# Patient Record
Sex: FEMALE | Race: WHITE | NOT HISPANIC OR LATINO | Employment: PART TIME | URBAN - METROPOLITAN AREA
[De-identification: names, ages, dates, MRNs, and addresses within clinical notes are randomized per-mention and may not be internally consistent; named-entity substitution may affect disease eponyms.]

---
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
## 2024-09-10 ENCOUNTER — HOSPITAL ENCOUNTER (EMERGENCY)
Facility: HOSPITAL | Age: 19
Discharge: HOME | End: 2024-09-10
Attending: EMERGENCY MEDICINE
Payer: COMMERCIAL

## 2024-09-10 ENCOUNTER — APPOINTMENT (OUTPATIENT)
Dept: GENERAL RADIOLOGY | Facility: HOSPITAL | Age: 19
End: 2024-09-10
Payer: COMMERCIAL

## 2024-09-10 VITALS
RESPIRATION RATE: 16 BRPM | HEIGHT: 64 IN | TEMPERATURE: 98.4 F | SYSTOLIC BLOOD PRESSURE: 114 MMHG | HEART RATE: 67 BPM | OXYGEN SATURATION: 97 % | BODY MASS INDEX: 30.05 KG/M2 | WEIGHT: 176 LBS | DIASTOLIC BLOOD PRESSURE: 65 MMHG

## 2024-09-10 DIAGNOSIS — M25.571 ARTHRALGIA OF RIGHT ANKLE: Primary | ICD-10-CM

## 2024-09-10 PROCEDURE — 99283 EMERGENCY DEPT VISIT LOW MDM: CPT

## 2024-09-10 PROCEDURE — 73610 X-RAY EXAM OF ANKLE: CPT

## 2024-09-10 RX ORDER — NAPROXEN 500 MG/1
500 TABLET ORAL
COMMUNITY
Start: 2024-08-15

## 2024-09-10 RX ORDER — LAMOTRIGINE 100 MG/1
1 TABLET ORAL DAILY
COMMUNITY

## 2024-09-10 RX ORDER — ETONOGESTREL 68 MG/1
68 IMPLANT SUBCUTANEOUS
COMMUNITY

## 2024-09-10 NOTE — ED PROVIDER NOTES
"Subjective   History of Present Illness  This patient is a very nice 19-year-old female who states that she is here for right ankle pain.  She injured her right ankle approximate 5 weeks ago after stepping awkwardly out of a horse trailer.  She tells me she went to a local urgent treatment center I had an x-ray that was negative.  She was placed on crutches and walked on these crutches for 2 weeks.  She tells me that a couple of days ago, she was running across a field and started having more pain in the right ankle.  She did not fall down, she did not hear a \"pop\" or have any specific trauma but tells me she started having some more pain after this running exercise/event.  Patient tells me she continues to have pain and rates it as 5/10.  She is ambulatory.  She has a history of left wrist surgery which she indicates makes walking on crutches a little more difficult than somebody without wrist surgery.  She has had no new falls.  She denies any shortness of breath.  Denies any swelling in the calf or legs.  Denies any personal history of DVT or concern for DVT.  She reports most of her pain is in the right lateral ankle and just an inferior to it.  She went to another [facility] a couple of days ago and tells me she got \"a pain shot.\"  She tells me no additional x-rays were obtained.  She comes in today hoping to be referred to a local orthopedist as she was referred to a foot and ankle specialist in [place] secondary to affiliation with the [facility] she went to in [facility].    In summary, we have a 19-year-old female here for right ankle pain over the last 5 weeks with a negative x-ray 5 weeks ago and continued discomfort.    Past medical history  Negative for hypertension, dyslipidemia, CAD or CVA.  Negative for diabetes    Family history  Negative for CAD or CVA      Review of Systems   Constitutional: Negative.  Negative for chills, fatigue, fever and unexpected weight change.   HENT:  Negative for dental " problem, ear pain, hearing loss and sinus pressure.    Eyes:  Negative for pain and visual disturbance.   Respiratory:  Negative for chest tightness and shortness of breath.    Cardiovascular:  Negative for chest pain, palpitations and leg swelling.   Gastrointestinal:  Negative for blood in stool, diarrhea, nausea and vomiting.   Genitourinary:  Negative for difficulty urinating, dysuria, frequency, hematuria and urgency.   Musculoskeletal:  Positive for arthralgias. Negative for myalgias, neck pain and neck stiffness.        Right ankle pain   Neurological:  Negative for seizures, syncope, speech difficulty, light-headedness and headaches.   Psychiatric/Behavioral:  Negative for confusion.    All other systems reviewed and are negative.      No past medical history on file.    No Known Allergies    No past surgical history on file.    No family history on file.    Social History     Socioeconomic History    Marital status: Single           Objective   Physical Exam  Vitals and nursing note reviewed.   Constitutional:       General: She is not in acute distress.     Appearance: Normal appearance. She is normal weight. She is not toxic-appearing.   HENT:      Head: Normocephalic and atraumatic.      Right Ear: External ear normal.      Left Ear: External ear normal.      Nose: Nose normal.      Mouth/Throat:      Mouth: Mucous membranes are moist.      Pharynx: Oropharynx is clear.   Eyes:      General:         Right eye: No discharge.         Left eye: No discharge.      Extraocular Movements: Extraocular movements intact.      Conjunctiva/sclera: Conjunctivae normal.      Pupils: Pupils are equal, round, and reactive to light.   Cardiovascular:      Rate and Rhythm: Normal rate and regular rhythm.      Pulses: Normal pulses.      Heart sounds: Normal heart sounds. No murmur heard.     No gallop.   Pulmonary:      Effort: Pulmonary effort is normal. No respiratory distress.      Breath sounds: Normal breath sounds.  No wheezing or rales.   Abdominal:      General: Abdomen is flat. Bowel sounds are normal. There is no distension.      Palpations: Abdomen is soft.      Tenderness: There is no abdominal tenderness. There is no guarding.   Musculoskeletal:         General: Tenderness present. No swelling or deformity.      Cervical back: Normal range of motion.      Right lower leg: No edema.      Left lower leg: No edema.      Comments: Mild tenderness right lateral malleolus.  Discomfort inferior and anterior to this joint.  No laxity.  No bony deformity.  No signs of calf asymmetry from a size standpoint.  No redness.  Patient is negative with regard to palpable cords bilaterally.   Skin:     General: Skin is warm and dry.      Capillary Refill: Capillary refill takes less than 2 seconds.      Coloration: Skin is not jaundiced or pale.      Findings: No bruising.   Neurological:      General: No focal deficit present.      Mental Status: She is alert and oriented to person, place, and time. Mental status is at baseline.      Cranial Nerves: No cranial nerve deficit.      Motor: No weakness.   Psychiatric:         Mood and Affect: Mood normal.         Behavior: Behavior normal.         Thought Content: Thought content normal.         Procedures           ED Course  ED Course as of 09/10/24 1322   Tue Sep 10, 2024   1037 I had a good conversation with the patient.  We talked about continued right ankle pain in the setting of a negative x-ray after trauma 5 weeks ago.  I am going to repeat the right ankle x-ray but I do not anticipate we will find any evidence of acute fracture or malalignment.  We have talked about ligamentous and tendinous injuries.  We will refer her to a foot and ankle physician here at [facility] [place], operated by [facility], Dr. Bird Blanco.  As noted earlier in the chart, patient has a follow-up appointment in [place] at [facility] but is unsure of exactly how she is going to get to [place].  We have talked about the need for  crutches as the patient is continue to have pain in the right ankle.  I told her to weight-bear as tolerated and to utilize the crutches she was already provided at the time of initial injury.  Patient has no signs of infection or trauma.  No swelling of the calf.  She does not have any evidence of DVT.  No palpable cords, asymmetric swelling, redness.  No shortness of breath or chest pain.  She has right lateral foot/ankle pain just inferior to lateral malleolus.  Patient does not want nor in my opinion need narcotic medication.  We have talked about elevation, compression, ice and heat as well as Motrin.  Final impression and plan following completion of workup.  I do anticipate ultimate discharge home. [MARILOU]   1122 X-ray has been completed.  I have reviewed independently.  I do not see any evidence of fracture or malalignment.  Official radiologic read confirms a forementioned read.  Patient does have some soft tissue swelling to the right lateral ankle only. [MARILOU]   1122 I have cut/pasted the official radiologic read below for completeness.       IMPRESSION:  Impression:  No acute fracture or malalignment. Soft tissue swelling of the lateral ankle.        Electronically Signed: Eleno Chua MD    9/10/2024 11:12 AM EDT    Workstation ID: [id number]   [MARILOU]   1123 Patient will be discharged home with an impression that includes right ankle arthralgia.  Patient should follow-up with orthopedics as instructed and referred today.  Told her to keep her leg elevated and to return here for new or changing concerns.  Patient is agreeable to the plan without question or complaint and will be discharged home accordingly. [MARILOU]      ED Course User Index  [MARILOU] Bird Martinez MD      No results found for this or any previous visit (from the past 24 hour(s)).  Note: In addition to lab results from this visit, the labs listed above may include labs taken at another facility or during a different encounter within the last 24  "hours. Please correlate lab times with ED admission and discharge times for further clarification of the services performed during this visit.    XR Ankle 3+ View Right   Final Result   Impression:   No acute fracture or malalignment. Soft tissue swelling of the lateral ankle.         Electronically Signed: Eleno Chua MD     9/10/2024 11:12 AM EDT     Workstation ID: [id number]        Vitals:    09/10/24 0946 09/10/24 1030   BP: 138/95 114/65   BP Location: Left arm    Patient Position: Sitting    Pulse: 81 67   Resp: 16    Temp: 98.4 °F (36.9 °C)    TempSrc: Oral    SpO2: 99% 97%   Weight: 79.8 kg (176 lb)    Height: 162.6 cm (64\")      Medications - No data to display  ECG/EMG Results (last 24 hours)       ** No results found for the last 24 hours. **          No orders to display                                              Medical Decision Making  Certainly must consider fracture, although the patient had a negative x-ray 5 weeks ago.  Will repeat x-ray here.  Patient would also be potentially at higher risk for ligamentous and tendinous injury given the continued pain over 5 weeks with reportedly negative x-ray.  Patient has a completely benign exam today.  She is ambulatory.  She has some mild discomfort in the right lateral ankle.  Patient will not get narcotics here but will get another x-ray.  Will have her follow-up with one of our foot and ankle specialist here and she is requesting somebody in [place] versus the referral she was given in [place].  Worst-case scenario, she does have an appointment in [place] next week.    Problems Addressed:  Arthralgia of right ankle: complicated acute illness or injury    Amount and/or Complexity of Data Reviewed  External Data Reviewed: notes.  Radiology: ordered. Decision-making details documented in ED Course.        Final diagnoses:   Arthralgia of right ankle       ED Disposition  ED Disposition       ED Disposition   Discharge    Condition   Stable    " Comment   --               Dinorah Nix, APRN  [address]  [facility] [address]  [phone]    In 1 week      Bird Blanco MD  [address] [id number] [phone]    In 1 week           Medication List      No changes were made to your prescriptions during this visit.            Bird Martinez MD  09/10/24 6793

## 2024-09-10 NOTE — DISCHARGE INSTRUCTIONS
Keep elevated.  Utilize crutches as already prescribed.    Tylenol, Motrin, compression, ice, heat and elevation as we discussed in detail the bedside before discharge.    Follow-up with orthopedics in the next week.  Call for an appointment.  I have referred you to a [facility] physician here.    Return to the emergency department immediately for new or change concerns.    Please review the medications you are supposed to be taking according to prior physician recommendations. I have not changed your home medications during this visit. If your discharge instructions indicate that I have changed your home medications, this is not the case, and you should disregard. If you have any questions about the medication you should be taking at home, please call your physician.

## 2024-11-08 ENCOUNTER — OFFICE VISIT (OUTPATIENT)
Dept: FAMILY MEDICINE CLINIC | Facility: CLINIC | Age: 19
End: 2024-11-08

## 2024-11-08 VITALS — BODY MASS INDEX: 30.05 KG/M2 | HEIGHT: 64 IN | WEIGHT: 176 LBS

## 2024-11-08 DIAGNOSIS — G90.521 COMPLEX REGIONAL PAIN SYNDROME TYPE 1 OF RIGHT LOWER EXTREMITY: Primary | ICD-10-CM

## 2024-11-08 PROBLEM — S93.491A SPRAIN OF ANTERIOR TALOFIBULAR LIGAMENT OF RIGHT ANKLE: Status: ACTIVE | Noted: 2024-10-10

## 2024-11-08 PROBLEM — M25.571 CHRONIC PAIN OF RIGHT ANKLE: Status: ACTIVE | Noted: 2024-09-06

## 2024-11-08 PROBLEM — M25.332: Status: ACTIVE | Noted: 2023-08-17

## 2024-11-08 PROBLEM — M25.532 LEFT WRIST PAIN: Status: ACTIVE | Noted: 2023-08-17

## 2024-11-08 PROBLEM — G89.29 CHRONIC PAIN OF RIGHT ANKLE: Status: ACTIVE | Noted: 2024-09-06

## 2024-11-08 PROBLEM — Z48.89 POSTOPERATIVE VISIT: Status: ACTIVE | Noted: 2024-01-09

## 2024-11-08 RX ORDER — AMANTADINE HYDROCHLORIDE 100 MG/1
TABLET ORAL
COMMUNITY
Start: 2024-10-10

## 2024-11-08 RX ORDER — DICLOFENAC SODIUM 75 MG/1
TABLET, DELAYED RELEASE ORAL
COMMUNITY
Start: 2024-11-04

## 2024-11-08 NOTE — PROGRESS NOTES
".Chief Complaint  CRPS    Subjective          History of Present Illness  Talita Marx is here today with her  History of Present Illness    Patient presents the clinic today interested in getting a referral to pain management.  She states that she was recently diagnosed with CRPS.  She has had an injury to her right foot as well as to her left wrist.  Both of these injuries have worsened over time.  She has been seen by orthopedics for this and has been diagnosed with CRPS.  She has a family history of CRPS including her father.  She has been referred to [facility] but is having a hard time getting an appointment with them.  She asked today if there is a way that we can get her in touch with pain management.  Objective   Vital Signs:   Ht 162.6 cm (64\")   Wt 79.8 kg (176 lb)   BMI 30.21 kg/m²     Body mass index is 30.21 kg/m².  BMI is >= 30 and <35. (Class 1 Obesity). The following options were offered after discussion;: information on healthy weight added to patient's after visit summary       Review of Systems      Current Outpatient Medications:   •  cyclobenzaprine (FLEXERIL) 10 MG tablet, Take 1 tablet by mouth 3 (Three) Times a Day As Needed for Muscle Spasms., Disp: 20 tablet, Rfl: 0  •  Etonogestrel (Nexplanon) 68 MG implant subdermal implant, To be inserted one time by prescriber. Route Subdermal., Disp: , Rfl:   •  FLUoxetine (PROzac) 20 MG capsule, Take 1 capsule by mouth Daily., Disp: , Rfl:   •  ibuprofen (ADVIL,MOTRIN) 600 MG tablet, Take 1 tablet by mouth Every 6 (Six) Hours As Needed for Mild Pain., Disp: 40 tablet, Rfl: 0  •  lamoTRIgine (LaMICtal) 100 MG tablet, Take 1 tablet by mouth Daily., Disp: , Rfl:   •  lidocaine (Lidoderm) 5 %, Place 1 patch on the skin as directed by provider Daily. Remove & Discard patch within 12 hours or as directed by MD, Disp: 30 patch, Rfl: 0  •  naproxen (NAPROSYN) 500 MG tablet, Take 1 tablet by mouth., Disp: , Rfl:     Allergies: Patient has no known " allergies.    Physical Exam  Vitals and nursing note reviewed.   Constitutional:       General: She is not in acute distress.     Appearance: Normal appearance. She is not ill-appearing, toxic-appearing or diaphoretic.      Comments: Right ankle/foot in boot and using scooter   HENT:      Head: Normocephalic and atraumatic.   Pulmonary:      Effort: Pulmonary effort is normal.   Neurological:      Mental Status: She is alert. Mental status is at baseline.   Psychiatric:         Mood and Affect: Mood normal.         Behavior: Behavior normal.      Physical Exam      Result Review :          Results               Assessment and Plan    Diagnoses and all orders for this visit:    1. Complex regional pain syndrome type 1 of right lower extremity (Primary)  -     Ambulatory Referral to Pain Management      Assessment & Plan        Follow Up   No follow-ups on file.  Patient was given instructions and counseling regarding her condition or for health maintenance advice. Please see specific information pulled into the AVS if appropriate.         MICHAEL Varela  11/08/2024

## 2024-11-18 ENCOUNTER — OFFICE VISIT (OUTPATIENT)
Dept: PAIN MEDICINE | Facility: CLINIC | Age: 19
End: 2024-11-18
Payer: COMMERCIAL

## 2024-11-18 VITALS — HEIGHT: 64 IN | WEIGHT: 188 LBS | BODY MASS INDEX: 32.1 KG/M2

## 2024-11-18 DIAGNOSIS — G90.521 COMPLEX REGIONAL PAIN SYNDROME I OF RIGHT LOWER LIMB: Primary | ICD-10-CM

## 2024-11-18 PROCEDURE — 99204 OFFICE O/P NEW MOD 45 MIN: CPT | Performed by: STUDENT IN AN ORGANIZED HEALTH CARE EDUCATION/TRAINING PROGRAM

## 2024-11-18 RX ORDER — PREGABALIN 50 MG/1
50 CAPSULE ORAL 2 TIMES DAILY
Qty: 60 CAPSULE | Refills: 1 | Status: SHIPPED | OUTPATIENT
Start: 2024-11-18

## 2024-11-18 NOTE — PROGRESS NOTES
Referring Physician: Judy Bal PA  [address] [place] [address]    Primary Physician: Dinorah Nix APRN    CHIEF COMPLAINT or REASON FOR VISIT: Leg Pain (New paient)      Initial history of present illness on 11/18/2024:  Ms. Talita Marx is 19 y.o. female who presents as a new patient referral for evaluation and treatment of chronic right foot pain.  Patient stepped out of a horse trailer in August 2024 and injured her right ankle.  She subsequently followed up with orthopedics and was noted to have hypersensitivity consistent with complex regional pain syndrome.  She describes a burning pain with tingling which has grown from the original source of injury at her right lateral ankle to include the foot and lateral shin.  She has had some redness and swelling as well as a significant limitation of range of motion.  She has been engaged in physical therapy.  She feels that is worsening over time.  She has tried topical antineuropathic with modest benefit.  She has tried cyclobenzaprine, Lidoderm, naproxen, diclofenac.  Her father did have a history of CRPS.  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    Interval history:    Interventions:      Objective Pain Scoring:   BRIEF PAIN INVENTORY:  Total score:   Pain Score    11/18/24 1453   PainSc:   4   PainLoc: Leg      PHQ-2: 4  PHQ-9: 13  Opioid Risk Tool:         Review of Systems:   ROS negative except as otherwise noted     Past Medical History:   Past Medical History:   Diagnosis Date    Bipolar disorder     Chronic pain disorder     Depression     Extremity pain          Past Surgical History:   Past Surgical History:   Procedure Laterality Date    ORTHOPEDIC SURGERY           Family History   Family History   Problem Relation Age of Onset    Depression Father     Mental illness Father     Cancer Maternal Grandmother     Diabetes Paternal Grandfather     Arthritis  "Paternal Aunt     Depression Paternal Aunt     Cancer Maternal Aunt     Mental illness Maternal Aunt          Social History   Social History     Socioeconomic History    Marital status: Single   Tobacco Use    Smoking status: Never    Smokeless tobacco: Never   Vaping Use    Vaping status: Never Used   Substance and Sexual Activity    Alcohol use: Never    Drug use: Never    Sexual activity: Yes     Partners: Male     Birth control/protection: Nexplanon        Medications:     Current Outpatient Medications:     diclofenac (VOLTAREN) 75 MG EC tablet, Take 1 tablet every 12 hours by oral route., Disp: , Rfl:     Etonogestrel (Nexplanon) 68 MG implant subdermal implant, To be inserted one time by prescriber. Route Subdermal., Disp: , Rfl:     FLUoxetine (PROzac) 20 MG capsule, Take 1 capsule by mouth Daily., Disp: , Rfl:     ibuprofen (ADVIL,MOTRIN) 600 MG tablet, Take 1 tablet by mouth Every 6 (Six) Hours As Needed for Mild Pain., Disp: 40 tablet, Rfl: 0    lamoTRIgine (LaMICtal) 100 MG tablet, Take 1 tablet by mouth Daily., Disp: , Rfl:     amantadine (SYMMETREL) 100 MG tablet, , Disp: , Rfl:     cyclobenzaprine (FLEXERIL) 10 MG tablet, Take 1 tablet by mouth 3 (Three) Times a Day As Needed for Muscle Spasms., Disp: 20 tablet, Rfl: 0    lidocaine (Lidoderm) 5 %, Place 1 patch on the skin as directed by provider Daily. Remove & Discard patch within 12 hours or as directed by MD, Disp: 30 patch, Rfl: 0    naproxen (NAPROSYN) 500 MG tablet, Take 1 tablet by mouth., Disp: , Rfl:         Physical Exam:     Vitals:    11/18/24 1453   Weight: 85.3 kg (188 lb)   Height: 162.6 cm (64\")   PainSc:   4   PainLoc: Leg        General: Alert and oriented, No acute distress.   HEENT: Normocephalic, atraumatic.   Cardiovascular: No gross edema  Respiratory: Respirations are non-labored      CRPS [place] Criteria LEFT RIGHT    Vasomotor (color, temp)  Yes   Sudomotor (swelling, sweating)  Yes   Motor/Trophic (ROM, strength, nails, " hair)  Yes   Sensory (allodynia, hyperalgesia)   Yes       Sensory Exam: Full and equal sensation to light touch throughout with hypersensitivity to light touch in right ankle      Neurologic: Cranial Nerves II-XII are grossly intact.     Psychiatric: Cooperative.   Gait: Antalgic  Assistive Devices: Right ankle boot    Imaging Studies:   No results found for this or any previous visit.        Independent review of radiographic imaging:     Impression & Plan:       11/18/2024: Talita Marx is a 19 y.o. female with past medical history significant for bipolar disorder, depression, right anterior talofibular ligamentous strain who presents to the pain clinic for evaluation and treatment of persistent right ankle pain.  Evaluation consistent with complex regional pain syndrome type I.  We discussed the pathophysiology of this diagnosis as well as the treatment including aggressive physical therapy, lumbar sympathetic nerve blocks, membrane stabilizers.  Additionally briefly discussed neuromodulation for persistent symptoms.  I had a discussion with the patient regarding the risks of the procedure including bleeding, infection, damage to surrounding structures.  We discussed the potential adverse effects of corticosteroid injection including flushing of the face, lipodystrophy, skin discoloration, elevated blood glucose, increased blood pressure.  Risks of frequent steroid administration include weight gain, hormonal changes, mood changes, osteoporosis.    1. Complex regional pain syndrome i of right lower limb        PLAN:  1. Medication Recommendations: Recommend Voltaren topical, NSAIDs, Tylenol.  Can trial turmeric 500 mg twice daily if NSAID contraindicated.  -Start pregabalin 50 mg twice daily 60 pills 1 refill  -As part of this patient's treatment plan, patient may be prescribed controlled substances. The patient has been made aware of appropriate use of such medications, including potential risk of somnolence,  limited ability to drive and /or work safely, and potential for dependence or overdose. It has also been made clear that these medications are for use by this patient only, without concomitant use of alcohol or other substances unless prescribed. Controlled substance status of medication discussed with patient, discussed risks of medication including abuse potential and diversion potential and need to follow up for reevaluation appointment in order to receive further refills.  Itz was reviewed and compliant.    2. Physical Therapy: Continue PT    3. Psychological: defer    4. Complementary and alternative (CAM) Therapies:     5. Labs/Diagnostic studies: None indicated     6. Imaging: None indicated     7. Interventions: Schedule right lumbar sympathetic nerve block (31005).  Will perform 3 total injections  by 2 weeks each    8. Referrals: None indicated     9. Records: outside orthopedics notes reviewed.     10. Lifestyle goals:    Follow-up 2 months      [facility]  Cole Amaya MD          Quality Metrics:

## 2024-12-05 ENCOUNTER — OUTSIDE FACILITY SERVICE (OUTPATIENT)
Dept: PAIN MEDICINE | Facility: CLINIC | Age: 19
End: 2024-12-05
Payer: COMMERCIAL

## 2024-12-05 ENCOUNTER — DOCUMENTATION (OUTPATIENT)
Dept: PAIN MEDICINE | Facility: CLINIC | Age: 19
End: 2024-12-05

## 2024-12-05 NOTE — PROGRESS NOTES
[facility]  [address] [place] [address]        PROCEDURE: Fluoroscopically-Guided Lumbar Sympathetic Block -right    PRE-OP DIAGNOSIS: Complex Regional Pain Syndrome  POST-OP DIAGNOSIS: Same    BLOOD THINNERS (ANTIPLATELETS/ANTICOAGULANTS): Discussed with patient and held according to BERRY guidelines.    CONSENT: Risks, benefits and options were explained to the patient, all questions were answered and written informed consent was obtained.     ANESTHESIA:  Moderate sedation was required to maintain comfort, safety, and cooperation during the procedure.  The duration of sedation service was over 10 minutes.  Patient received 2mg IV Versed and 50mcg IV fentanyl.  Independent observation and monitoring was performed by Una Penaloza.  The patient's level of consciousness and physiologic status was continually monitored with pulse oximetry, EKG from 0834 to 0852.  There were no complications or adverse events during sedation.  After the sedation patient was taken to the recovery area.      PROCEDURE NOTE: The patient was placed prone with pillow under the chest and all pressure points padded. Standard ASA monitors were applied. A timeout protocol was performed. Proper protective gear was worn by the physician including a mask, scrub cap, and sterile gloves. The patient's lumbar spine was prepped with Chlorhexadine and draped in a sterile fashion. Utilizing fluoroscopic guidance the L3 vertebral body was identified. An oblique fluoroscopic view was used to visualize the right side of the target vertebral body such that the transverse process was superimposed over the lateral edge of the vertebral body. Using 1% lidocaine, a skin wheal was raised and deeper tissues infiltrated for anesthesia. Utilizing fluoroscopic guidance, a 22 gauge 5 inch spinal needle was introduced towards the superoanterior vertebral body initially in the oblique view and then in the lateral view  until the tip of the needle was located just posterior to the anterior margin of the target vertebral body. AP view confirmed the needle tip location in line with the facet column. After negative aspiration, 1 mL of Omnipaque contrast was injected to confirm appropriate placement. The contrast spread longitudinally along the anterolateral aspect of the vertebral bodies and there was no evidence of intravascular or intrathecal spread. Then a mixture consisting of 9cc 0.5% Bupivacaine and 10mg Dexamethasone was injected for a total of volume of 10cc. Needles were than removed. Bandages were applied to needle sites and the patient was then transferred to the stretcher and taken to recovery in stable condition.     EBL: None    COMPLICATIONS: None      The patient tolerated the procedure well. Vital signs were stable. Sensory and motor exam was unchanged from baseline. The patient was observed in recovery and was discharged after meeting established criteria.    FOLLOW UP: As scheduled     ADDITIONAL NOTES:         [facility]  Cole Amaya MD    Codes:  [id number]  [phone]  [phone]

## 2024-12-06 ENCOUNTER — HOSPITAL ENCOUNTER (EMERGENCY)
Facility: HOSPITAL | Age: 19
Discharge: HOME | End: 2024-12-07
Attending: EMERGENCY MEDICINE
Payer: COMMERCIAL

## 2024-12-06 ENCOUNTER — APPOINTMENT (OUTPATIENT)
Facility: HOSPITAL | Age: 19
End: 2024-12-06
Payer: COMMERCIAL

## 2024-12-06 DIAGNOSIS — M54.6 ACUTE LEFT-SIDED THORACIC BACK PAIN: Primary | ICD-10-CM

## 2024-12-06 LAB
ALBUMIN SERPL-MCNC: 4 G/DL (ref 3.5–5.2)
ALBUMIN/GLOB SERPL: 1.5 G/DL
ALP SERPL-CCNC: 56 U/L (ref 39–117)
ALT SERPL W P-5'-P-CCNC: 7 U/L (ref 1–33)
ANION GAP SERPL CALCULATED.3IONS-SCNC: 13.8 MMOL/L (ref 5–15)
AST SERPL-CCNC: 12 U/L (ref 1–32)
BASOPHILS # BLD AUTO: 0.03 10*3/MM3 (ref 0–0.2)
BASOPHILS NFR BLD AUTO: 0.3 % (ref 0–1.5)
BILIRUB SERPL-MCNC: 0.3 MG/DL (ref 0–1.2)
BUN SERPL-MCNC: 8 MG/DL (ref 6–20)
BUN/CREAT SERPL: 12.7 (ref 7–25)
CALCIUM SPEC-SCNC: 8.6 MG/DL (ref 8.6–10.5)
CHLORIDE SERPL-SCNC: 107 MMOL/L (ref 98–107)
CO2 SERPL-SCNC: 21.2 MMOL/L (ref 22–29)
CREAT SERPL-MCNC: 0.63 MG/DL (ref 0.57–1)
DEPRECATED RDW RBC AUTO: 40.9 FL (ref 37–54)
EGFRCR SERPLBLD CKD-EPI 2021: 130.4 ML/MIN/1.73
EOSINOPHIL # BLD AUTO: 0.03 10*3/MM3 (ref 0–0.4)
EOSINOPHIL NFR BLD AUTO: 0.3 % (ref 0.3–6.2)
ERYTHROCYTE [DISTWIDTH] IN BLOOD BY AUTOMATED COUNT: 12.9 % (ref 12.3–15.4)
GLOBULIN UR ELPH-MCNC: 2.7 GM/DL
GLUCOSE SERPL-MCNC: 93 MG/DL (ref 65–99)
HCT VFR BLD AUTO: 41.8 % (ref 34–46.6)
HGB BLD-MCNC: 14.3 G/DL (ref 12–15.9)
IMM GRANULOCYTES # BLD AUTO: 0.02 10*3/MM3 (ref 0–0.05)
IMM GRANULOCYTES NFR BLD AUTO: 0.2 % (ref 0–0.5)
LYMPHOCYTES # BLD AUTO: 4.16 10*3/MM3 (ref 0.7–3.1)
LYMPHOCYTES NFR BLD AUTO: 42.1 % (ref 19.6–45.3)
MCH RBC QN AUTO: 29.5 PG (ref 26.6–33)
MCHC RBC AUTO-ENTMCNC: 34.2 G/DL (ref 31.5–35.7)
MCV RBC AUTO: 86.2 FL (ref 79–97)
MONOCYTES # BLD AUTO: 0.6 10*3/MM3 (ref 0.1–0.9)
MONOCYTES NFR BLD AUTO: 6.1 % (ref 5–12)
NEUTROPHILS NFR BLD AUTO: 5.03 10*3/MM3 (ref 1.7–7)
NEUTROPHILS NFR BLD AUTO: 51 % (ref 42.7–76)
PLATELET # BLD AUTO: 320 10*3/MM3 (ref 140–450)
PMV BLD AUTO: 10.2 FL (ref 6–12)
POTASSIUM SERPL-SCNC: 3.5 MMOL/L (ref 3.5–5.2)
PROT SERPL-MCNC: 6.7 G/DL (ref 6–8.5)
RBC # BLD AUTO: 4.85 10*6/MM3 (ref 3.77–5.28)
SODIUM SERPL-SCNC: 142 MMOL/L (ref 136–145)
WBC NRBC COR # BLD AUTO: 9.87 10*3/MM3 (ref 3.4–10.8)

## 2024-12-06 PROCEDURE — 96374 THER/PROPH/DIAG INJ IV PUSH: CPT

## 2024-12-06 PROCEDURE — 71046 X-RAY EXAM CHEST 2 VIEWS: CPT

## 2024-12-06 PROCEDURE — 85025 COMPLETE CBC W/AUTO DIFF WBC: CPT | Performed by: EMERGENCY MEDICINE

## 2024-12-06 PROCEDURE — 99283 EMERGENCY DEPT VISIT LOW MDM: CPT

## 2024-12-06 PROCEDURE — 85379 FIBRIN DEGRADATION QUANT: CPT | Performed by: EMERGENCY MEDICINE

## 2024-12-06 PROCEDURE — 25010000002 KETOROLAC TROMETHAMINE PER 15 MG: Performed by: EMERGENCY MEDICINE

## 2024-12-06 PROCEDURE — 80053 COMPREHEN METABOLIC PANEL: CPT | Performed by: EMERGENCY MEDICINE

## 2024-12-06 RX ORDER — SODIUM CHLORIDE 0.9 % (FLUSH) 0.9 %
10 SYRINGE (ML) INJECTION AS NEEDED
Status: DISCONTINUED | OUTPATIENT
Start: 2024-12-06 | End: 2024-12-07 | Stop reason: HOSPADM

## 2024-12-06 RX ORDER — KETOROLAC TROMETHAMINE 15 MG/ML
15 INJECTION, SOLUTION INTRAMUSCULAR; INTRAVENOUS ONCE
Status: COMPLETED | OUTPATIENT
Start: 2024-12-06 | End: 2024-12-06

## 2024-12-06 RX ADMIN — KETOROLAC TROMETHAMINE 15 MG: 15 INJECTION, SOLUTION INTRAMUSCULAR; INTRAVENOUS at 23:29

## 2024-12-07 VITALS
SYSTOLIC BLOOD PRESSURE: 113 MMHG | WEIGHT: 183 LBS | HEIGHT: 63 IN | RESPIRATION RATE: 19 BRPM | OXYGEN SATURATION: 97 % | TEMPERATURE: 98.6 F | HEART RATE: 83 BPM | BODY MASS INDEX: 32.43 KG/M2 | DIASTOLIC BLOOD PRESSURE: 76 MMHG

## 2024-12-07 LAB — D DIMER PPP FEU-MCNC: <0.27 MCGFEU/ML (ref 0–0.5)

## 2024-12-07 NOTE — FSED PROVIDER NOTE
"Subjective  History of Present Illness:    The patient reports difficulty speaking, left costal margin pain, and upper back pain. Pt has dyspnea on exertion. Pt has a history of right lower extremity pain attributed to her CRPS and she had a sympathetic nerve block yesterday to treat this.  The patient denies fever or cough.        Nurses Notes reviewed and agree, including vitals, allergies, social history and prior medical history.     REVIEW OF SYSTEMS:   Review of Systems   HENT:  Positive for voice change.    Respiratory:  Positive for shortness of breath.        Past Medical History:   Diagnosis Date    Bipolar disorder     Chronic pain disorder     Depression     Extremity pain        Allergies:    Patient has no known allergies.      Past Surgical History:   Procedure Laterality Date    ORTHOPEDIC SURGERY           Social History     Socioeconomic History    Marital status: Single   Tobacco Use    Smoking status: Never    Smokeless tobacco: Never   Vaping Use    Vaping status: Never Used   Substance and Sexual Activity    Alcohol use: Never    Drug use: Never    Sexual activity: Yes     Partners: Male     Birth control/protection: Nexplanon         Family History   Problem Relation Age of Onset    Depression Father     Mental illness Father     Cancer Maternal Grandmother     Diabetes Paternal Grandfather     Arthritis Paternal Aunt     Depression Paternal Aunt     Cancer Maternal Aunt     Mental illness Maternal Aunt        Objective  Physical Exam:  /84 (BP Location: Left arm, Patient Position: Lying)   Pulse 79   Temp 98.6 °F (37 °C) (Oral)   Resp 19   Ht 160 cm (63\")   Wt 83 kg (183 lb)   LMP 11/22/2024 (Approximate)   SpO2 98%   BMI 32.42 kg/m²      Physical Exam  Vitals and nursing note reviewed.   Constitutional:       Appearance: Normal appearance. She is obese.   HENT:      Right Ear: External ear normal.      Left Ear: External ear normal.      Nose: Nose normal.      Mouth/Throat:    "   Mouth: Mucous membranes are moist.   Eyes:      Extraocular Movements: Extraocular movements intact.   Cardiovascular:      Rate and Rhythm: Normal rate and regular rhythm.   Pulmonary:      Effort: Pulmonary effort is normal.      Breath sounds: Normal breath sounds.   Musculoskeletal:         General: Normal range of motion.   Skin:     General: Skin is warm and dry.   Neurological:      General: No focal deficit present.      Mental Status: She is alert.   Psychiatric:         Mood and Affect: Mood normal.         Behavior: Behavior normal.         Thought Content: Thought content normal.       Procedures    ED Course:         Lab Results (last 24 hours)       ** No results found for the last 24 hours. **             No radiology results from the last 24 hrs       MDM      Initial impression of presenting illness: Back pain    DDX: includes but is not limited to: Pneumothorax, phrenic nerve paralysis, high spinal    Patient arrives by private vehicle with vitals interpreted by myself.     Pertinent features from physical exam: Normal exam.    Initial diagnostic plan: Labs and imaging    Results from initial plan were reviewed and interpreted by me revealing nonactionable    Diagnostic information from other sources: I reviewed the patient's outside studies including procedure note    Interventions / Re-evaluation: Patient reports some relief of pain    Medications - No data to display    Results/clinical rationale were discussed with patient and patient's boyfriend      Data interpreted: Nursing notes reviewed, vital signs reviewed.      Images independantly reviewed and Chest XR reviewed independently interpreted by me.  EKG independently interpreted by me.  O2 saturation:    The patient was given anticipatory guidance and return precautions     Care significantly affected by Social Determinants of Health (housing and economic circumstances, unemployment)               [] Yes     [x] No              If yes,  Patient's care significantly limited by  Social Determinants of Health including:              [] Inadequate housing              [] Low income              [] Alcoholism and drug addiction in family              [] Problems related to primary support group              [] Unemployment              [] Problems related to employment              [] Other Social Determinants of Health:     Counseling: Discussed the results above with the patient regarding need for discharged home. Return precautions were given to patient and patient's boyfriend .  Patient understands and agrees plan of care.      -----  ED Disposition       None          Final diagnoses:   None     Your Follow-Up Providers    Follow-up information has not been specified.       Contact information for after-discharge care    Follow-up information has not been specified.          Your medication list        ASK your doctor about these medications        Instructions Last Dose Given Next Dose Due   amantadine 100 MG tablet  Commonly known as: SYMMETREL           cyclobenzaprine 10 MG tablet  Commonly known as: FLEXERIL      Take 1 tablet by mouth 3 (Three) Times a Day As Needed for Muscle Spasms.       diclofenac 75 MG EC tablet  Commonly known as: VOLTAREN      Take 1 tablet every 12 hours by oral route.       FLUoxetine 20 MG capsule  Commonly known as: PROzac      Take 1 capsule by mouth Daily.       ibuprofen 600 MG tablet  Commonly known as: ADVIL,MOTRIN      Take 1 tablet by mouth Every 6 (Six) Hours As Needed for Mild Pain.       lamoTRIgine 100 MG tablet  Commonly known as: LaMICtal      Take 1 tablet by mouth Daily.       lidocaine 5 %  Commonly known as: Lidoderm      Place 1 patch on the skin as directed by provider Daily. Remove & Discard patch within 12 hours or as directed by MD       naproxen 500 MG tablet  Commonly known as: NAPROSYN      Take 1 tablet by mouth.       Nexplanon 68 MG implant subdermal implant  Generic drug: Etonogestrel       To be inserted one time by prescriber. Route Subdermal.       pregabalin 50 MG capsule  Commonly known as: LYRICA      Take 1 capsule by mouth 2 (Two) Times a Day.

## 2024-12-11 ENCOUNTER — TELEPHONE (OUTPATIENT)
Dept: PAIN MEDICINE | Facility: CLINIC | Age: 19
End: 2024-12-11
Payer: COMMERCIAL

## 2024-12-11 NOTE — TELEPHONE ENCOUNTER
Left VM for patient to call the office regarding the amount of pain relief she received from her injection on 12/5/2024.

## 2024-12-12 ENCOUNTER — TELEPHONE (OUTPATIENT)
Dept: PAIN MEDICINE | Facility: CLINIC | Age: 19
End: 2024-12-12
Payer: COMMERCIAL

## 2024-12-12 NOTE — TELEPHONE ENCOUNTER
S/w patient, she states she received 15% relief from her #1 right lumbar sympathetic nerve block  with Dr Amaya on 12/5/2024.

## 2024-12-19 ENCOUNTER — OUTSIDE FACILITY SERVICE (OUTPATIENT)
Dept: PAIN MEDICINE | Facility: CLINIC | Age: 19
End: 2024-12-19
Payer: COMMERCIAL

## 2024-12-19 ENCOUNTER — DOCUMENTATION (OUTPATIENT)
Dept: PAIN MEDICINE | Facility: CLINIC | Age: 19
End: 2024-12-19

## 2024-12-19 NOTE — PROGRESS NOTES
[facility]  [address] [place] [address]        PROCEDURE: #2 Fluoroscopically-Guided Lumbar Sympathetic Block -right    PRE-OP DIAGNOSIS: Complex Regional Pain Syndrome  POST-OP DIAGNOSIS: Same    BLOOD THINNERS (ANTIPLATELETS/ANTICOAGULANTS): Discussed with patient and held according to BERRY guidelines.    CONSENT: Risks, benefits and options were explained to the patient, all questions were answered and written informed consent was obtained.     ANESTHESIA:  Moderate sedation was required to maintain comfort, safety, and cooperation during the procedure.  The duration of sedation service was over 10 minutes.  Patient received 2mg IV Versed and 100mcg IV fentanyl.  Independent observation and monitoring was performed by May Smart.  The patient's level of consciousness and physiologic status was continually monitored with pulse oximetry, EKG from 0844 to 0858.  There were no complications or adverse events during sedation.  After the sedation patient was taken to the recovery area.        PROCEDURE NOTE: The patient was placed prone with pillow under the chest and all pressure points padded. Standard ASA monitors were applied. A timeout protocol was performed. Proper protective gear was worn by the physician including a mask, scrub cap, and sterile gloves. The patient's lumbar spine was prepped with Chlorhexadine and draped in a sterile fashion. Utilizing fluoroscopic guidance the L3 vertebral body was identified. An oblique fluoroscopic view was used to visualize the right side of the target vertebral body such that the transverse process was superimposed over the lateral edge of the vertebral body. Using 1% lidocaine, a skin wheal was raised and deeper tissues infiltrated for anesthesia. Utilizing fluoroscopic guidance, a 22 gauge 5 inch spinal needle was introduced towards the superoanterior vertebral body initially in the oblique view and then in the lateral  view until the tip of the needle was located just posterior to the anterior margin of the target vertebral body. AP view confirmed the needle tip location in line with the facet column. After negative aspiration, 1 mL of Omnipaque contrast was injected to confirm appropriate placement. The contrast spread longitudinally along the anterolateral aspect of the vertebral bodies and there was no evidence of intravascular or intrathecal spread. Then a mixture consisting of 9cc 0.5% Bupivacaine and 10mg Dexamethasone was injected for a total of volume of 10cc. Needles were than removed. Bandages were applied to needle sites and the patient was then transferred to the stretcher and taken to recovery in stable condition.     EBL: None    COMPLICATIONS: None      The patient tolerated the procedure well. Vital signs were stable. Sensory and motor exam was unchanged from baseline. The patient was observed in recovery and was discharged after meeting established criteria.    FOLLOW UP: As scheduled     ADDITIONAL NOTES:         [facility]  Cole Amaya MD    Codes:  [id number]  [id number]  [phone]

## 2024-12-26 ENCOUNTER — TELEPHONE (OUTPATIENT)
Dept: PAIN MEDICINE | Facility: CLINIC | Age: 19
End: 2024-12-26
Payer: COMMERCIAL

## 2024-12-26 NOTE — TELEPHONE ENCOUNTER
S/w patient, she states her pain has improved by an additional 15% to the 15% from her previous procedure on 12/5/2024

## 2025-01-02 ENCOUNTER — OUTSIDE FACILITY SERVICE (OUTPATIENT)
Dept: PAIN MEDICINE | Facility: CLINIC | Age: 20
End: 2025-01-02
Payer: COMMERCIAL

## 2025-01-02 ENCOUNTER — DOCUMENTATION (OUTPATIENT)
Dept: PAIN MEDICINE | Facility: CLINIC | Age: 20
End: 2025-01-02

## 2025-01-02 NOTE — PROGRESS NOTES
[facility]  [address] [place] [address]        PROCEDURE: #3 Fluoroscopically-Guided Lumbar Sympathetic Block -right    PRE-OP DIAGNOSIS: Complex Regional Pain Syndrome  POST-OP DIAGNOSIS: Same    BLOOD THINNERS (ANTIPLATELETS/ANTICOAGULANTS): Discussed with patient and held according to BERRY guidelines.    CONSENT: Risks, benefits and options were explained to the patient, all questions were answered and written informed consent was obtained.     ANESTHESIA: Moderate sedation was required to maintain comfort, safety, and cooperation during the procedure.  The duration of sedation service was over 10 minutes.  Patient received 3mg IV Versed and 100mcg IV fentanyl.  Independent observation and monitoring was performed by reji fitzgerald.  The patient's level of consciousness and physiologic status was continually monitored with pulse oximetry, EKG from 0828 to 0839.  There were no complications or adverse events during sedation.  After the sedation patient was taken to the recovery area.          PROCEDURE NOTE: The patient was placed prone with pillow under the chest and all pressure points padded. Standard ASA monitors were applied. A timeout protocol was performed. Proper protective gear was worn by the physician including a mask, scrub cap, and sterile gloves. The patient's lumbar spine was prepped with Chlorhexadine and draped in a sterile fashion. Utilizing fluoroscopic guidance the L3 vertebral body was identified. An oblique fluoroscopic view was used to visualize the right side of the target vertebral body such that the transverse process was superimposed over the lateral edge of the vertebral body. Using 1% lidocaine, a skin wheal was raised and deeper tissues infiltrated for anesthesia. Utilizing fluoroscopic guidance, a 22 gauge 5 inch spinal needle was introduced towards the superoanterior vertebral body initially in the oblique view and then in the lateral  view until the tip of the needle was located just posterior to the anterior margin of the target vertebral body. AP view confirmed the needle tip location in line with the facet column. After negative aspiration, 1 mL of Omnipaque contrast was injected to confirm appropriate placement. The contrast spread longitudinally along the anterolateral aspect of the vertebral bodies and there was no evidence of intravascular or intrathecal spread. Then a mixture consisting of 9cc 0.5% Bupivacaine and 10mg Dexamethasone was injected for a total of volume of 10cc. Needles were than removed. Bandages were applied to needle sites and the patient was then transferred to the stretcher and taken to recovery in stable condition.     EBL: None    COMPLICATIONS: None      The patient tolerated the procedure well. Vital signs were stable. Sensory and motor exam was unchanged from baseline. The patient was observed in recovery and was discharged after meeting established criteria.    FOLLOW UP: As scheduled     ADDITIONAL NOTES:         [facility]  Cole Amaya MD    Codes:  [id number]  [id number]  [id number]

## 2025-01-21 ENCOUNTER — PATIENT MESSAGE (OUTPATIENT)
Dept: PAIN MEDICINE | Facility: CLINIC | Age: 20
End: 2025-01-21
Payer: COMMERCIAL

## 2025-01-23 ENCOUNTER — LAB (OUTPATIENT)
Dept: LAB | Facility: HOSPITAL | Age: 20
End: 2025-01-23
Payer: COMMERCIAL

## 2025-01-23 ENCOUNTER — OFFICE VISIT (OUTPATIENT)
Dept: PAIN MEDICINE | Facility: CLINIC | Age: 20
End: 2025-01-23
Payer: COMMERCIAL

## 2025-01-23 VITALS — HEIGHT: 63 IN | BODY MASS INDEX: 32.96 KG/M2 | WEIGHT: 186 LBS

## 2025-01-23 DIAGNOSIS — G90.521 COMPLEX REGIONAL PAIN SYNDROME I OF RIGHT LOWER LIMB: Primary | ICD-10-CM

## 2025-01-23 DIAGNOSIS — Z51.81 THERAPEUTIC DRUG MONITORING: ICD-10-CM

## 2025-01-23 DIAGNOSIS — Z51.81 THERAPEUTIC DRUG MONITORING: Primary | ICD-10-CM

## 2025-01-23 LAB
AMPHET+METHAMPHET UR QL: NEGATIVE
AMPHETAMINES UR QL: NEGATIVE
BARBITURATES UR QL SCN: NEGATIVE
BENZODIAZ UR QL SCN: NEGATIVE
BUPRENORPHINE SERPL-MCNC: NEGATIVE NG/ML
CANNABINOIDS SERPL QL: NEGATIVE
COCAINE UR QL: NEGATIVE
METHADONE UR QL SCN: NEGATIVE
OPIATES UR QL: NEGATIVE
OXYCODONE UR QL SCN: NEGATIVE
PCP UR QL SCN: NEGATIVE
TRICYCLICS UR QL SCN: NEGATIVE

## 2025-01-23 PROCEDURE — 99214 OFFICE O/P EST MOD 30 MIN: CPT

## 2025-01-23 PROCEDURE — 80307 DRUG TEST PRSMV CHEM ANLYZR: CPT

## 2025-01-23 RX ORDER — VENLAFAXINE HYDROCHLORIDE 37.5 MG/1
37.5 CAPSULE, EXTENDED RELEASE ORAL DAILY
COMMUNITY
Start: 2025-01-22 | End: 2025-04-22

## 2025-01-23 RX ORDER — GABAPENTIN 100 MG/1
100 CAPSULE ORAL 2 TIMES DAILY
Qty: 60 CAPSULE | Refills: 0 | Status: SHIPPED | OUTPATIENT
Start: 2025-01-23

## 2025-01-23 RX ORDER — HYDROXYZINE HYDROCHLORIDE 25 MG/1
25 TABLET, FILM COATED ORAL
COMMUNITY
Start: 2025-01-22 | End: 2025-04-22

## 2025-01-23 NOTE — PROGRESS NOTES
Referring Physician: No referring provider defined for this encounter.    Primary Physician: Dinorah Nix APRN    CHIEF COMPLAINT or REASON FOR VISIT: Follow-up (Post Lumbar Sympathetic Block -right) and Leg Pain (Right )      Initial history of present illness on 11/18/2024:  Ms. Talita Marx is 19 y.o. female who presents as a new patient referral for evaluation and treatment of chronic right foot pain.  Patient stepped out of a horse trailer in August 2024 and injured her right ankle.  She subsequently followed up with orthopedics and was noted to have hypersensitivity consistent with complex regional pain syndrome.  She describes a burning pain with tingling which has grown from the original source of injury at her right lateral ankle to include the foot and lateral shin.  She has had some redness and swelling as well as a significant limitation of range of motion.  She has been engaged in physical therapy.  She feels that is worsening over time.  She has tried topical antineuropathic with modest benefit.  She has tried cyclobenzaprine, Lidoderm, naproxen, diclofenac.  Her father did have a history of CRPS.  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    Interval history:  Patient returns to clinic today for medication management as well as follow-up after undergoing a series of right sympathetic nerve blocks.  Patient reports approximately 30% relief for 1 day following each procedure.  She did notice some improvement in her symptoms.  She tried taking pregabalin 50 mg twice daily without any significant relief.  She has not noticed any relief in her symptoms after taking this medication.  She is interested in additional strategies to help alleviate her pain.  We discussed trying a different membrane stabilizer such as gabapentin and potential neuromodulation at today's appointment.  She has recently started new medication for  depression.  She denies any active thoughts of self-harm/active suicidal ideation.      Interventions:  12/5/2024: Right sympathetic nerve block with 30% relief for 1 day  12/19/2024: Right sympathetic nerve block with 30% relief for 1 day  1/02/2025: Right sympathetic nerve block with 30% relief for 1 day    Objective Pain Scoring:   BRIEF PAIN INVENTORY:  Total score:   Pain Score    01/23/25 1438   PainSc:   6   PainLoc: Leg      PHQ-2: 6  PHQ-9: 20  Opioid Risk Tool:         Review of Systems:   ROS negative except as otherwise noted     Past Medical History:   Past Medical History:   Diagnosis Date    Bipolar disorder     Chronic pain disorder     Depression     Extremity pain          Past Surgical History:   Past Surgical History:   Procedure Laterality Date    ORTHOPEDIC SURGERY           Family History   Family History   Problem Relation Age of Onset    Depression Father     Mental illness Father     Cancer Maternal Grandmother     Diabetes Paternal Grandfather     Arthritis Paternal Aunt     Depression Paternal Aunt     Cancer Maternal Aunt     Mental illness Maternal Aunt          Social History   Social History     Socioeconomic History    Marital status: Single   Tobacco Use    Smoking status: Never    Smokeless tobacco: Never   Vaping Use    Vaping status: Never Used   Substance and Sexual Activity    Alcohol use: Never    Drug use: Never    Sexual activity: Yes     Partners: Male     Birth control/protection: Nexplanon        Medications:     Current Outpatient Medications:     diclofenac (VOLTAREN) 75 MG EC tablet, Take 1 tablet every 12 hours by oral route., Disp: , Rfl:     Etonogestrel (Nexplanon) 68 MG implant subdermal implant, To be inserted one time by prescriber. Route Subdermal., Disp: , Rfl:     hydrOXYzine (ATARAX) 25 MG tablet, Take 1 tablet by mouth., Disp: , Rfl:     ibuprofen (ADVIL,MOTRIN) 600 MG tablet, Take 1 tablet by mouth Every 6 (Six) Hours As Needed for Mild Pain., Disp: 40  "tablet, Rfl: 0    venlafaxine XR (EFFEXOR-XR) 37.5 MG 24 hr capsule, Take 1 capsule by mouth Daily., Disp: , Rfl:         Physical Exam:     Vitals:    01/23/25 1438   Weight: 84.4 kg (186 lb)   Height: 160 cm (62.99\")   PainSc:   6   PainLoc: Leg        General: Alert and oriented, No acute distress.   HEENT: Normocephalic, atraumatic.   Cardiovascular: No gross edema  Respiratory: Respirations are non-labored      CRPS [place] Criteria LEFT RIGHT    Vasomotor (color, temp)  Yes   Sudomotor (swelling, sweating)  Yes   Motor/Trophic (ROM, strength, nails, hair)  Yes   Sensory (allodynia, hyperalgesia)   Yes       Sensory Exam: Full and equal sensation to light touch throughout with hypersensitivity to light touch in right ankle      Neurologic: Cranial Nerves II-XII are grossly intact.     Psychiatric: Cooperative.   Gait: Antalgic  Assistive Devices: Right ankle boot; crutch    Imaging Studies:   No results found for this or any previous visit.        Independent review of radiographic imaging:     Impression & Plan:       11/18/2024: Talita Marx is a 19 y.o. female with past medical history significant for bipolar disorder, depression, right anterior talofibular ligamentous strain who presents to the pain clinic for evaluation and treatment of persistent right ankle pain.  Evaluation consistent with complex regional pain syndrome type I.  We discussed the pathophysiology of this diagnosis as well as the treatment including aggressive physical therapy, lumbar sympathetic nerve blocks, membrane stabilizers.  Additionally briefly discussed neuromodulation for persistent symptoms.  I had a discussion with the patient regarding the risks of the procedure including bleeding, infection, damage to surrounding structures.  We discussed the potential adverse effects of corticosteroid injection including flushing of the face, lipodystrophy, skin discoloration, elevated blood glucose, increased blood pressure.  Risks of " frequent steroid administration include weight gain, hormonal changes, mood changes, osteoporosis.  1/23/2025: Some but transient relief from right sympathetic nerve blocks.  Will discontinue pregabalin due ineffectiveness.  Start low-dose gabapentin 100 mg twice daily.  We discussed potential spinal cord stimulator versus dorsal root ganglion stimulator at today's appointment.  Will attempt to exhaust conservative measures.    1. Complex regional pain syndrome i of right lower limb          PLAN:  1. Medication Recommendations: Recommend Voltaren topical, NSAIDs, Tylenol.  Can trial turmeric 500 mg twice daily if NSAID contraindicated.  -Discontinue pregabalin  Start gabapentin 100 mg twice daily, 60 pills, #0 refills  -As part of this patient's treatment plan, patient may be prescribed controlled substances. The patient has been made aware of appropriate use of such medications, including potential risk of somnolence, limited ability to drive and /or work safely, and potential for dependence or overdose. It has also been made clear that these medications are for use by this patient only, without concomitant use of alcohol or other substances unless prescribed. Controlled substance status of medication discussed with patient, discussed risks of medication including abuse potential and diversion potential and need to follow up for reevaluation appointment in order to receive further refills.  Itz was reviewed and compliant.    2. Physical Therapy: Continue PT    3. Psychological: Can consider psychiatric clearance from [facility] for potential spinal cord stimulator versus dorsal root ganglion stimulator    4. Complementary and alternative (CAM) Therapies:     5. Labs/Diagnostic studies: Obtain compliance UDS    6. Imaging: May consider appropriate lumbar and thoracic imaging if pursuing SCS/DRG    7. Interventions: May consider SCS/DRG if failure to respond to additional conservative measures  8.  Referrals: None indicated     9. Records: Itz reviewed    10. Lifestyle goals:    Follow-up 1 month    [facility]  Ford Lester PA-C          Quality Metrics:

## 2025-01-23 NOTE — TELEPHONE ENCOUNTER
Discontinue pregabalin  Start gabapentin  Gabapentin 100 mg twice daily, 60 pills, #0 refills  Itz reviewed and compliant  Pending UDS  Controlled substance agreement signed in office  Appropriate follow-up visit scheduled

## 2025-01-24 LAB — FENTANYL UR-MCNC: NEGATIVE NG/ML

## 2025-01-27 ENCOUNTER — PATIENT MESSAGE (OUTPATIENT)
Dept: PAIN MEDICINE | Facility: CLINIC | Age: 20
End: 2025-01-27
Payer: COMMERCIAL

## 2025-01-27 DIAGNOSIS — Z01.818 ENCOUNTER FOR PRE-OPERATIVE EXAMINATION: ICD-10-CM

## 2025-01-27 DIAGNOSIS — Z00.8 ENCOUNTER FOR PRE-SURGICAL PSYCHOLOGICAL ASSESSMENT: Primary | ICD-10-CM

## 2025-02-19 ENCOUNTER — TELEPHONE (OUTPATIENT)
Dept: PAIN MEDICINE | Facility: CLINIC | Age: 20
End: 2025-02-19

## 2025-02-19 NOTE — TELEPHONE ENCOUNTER
Attempted to contact patient but no answer. LVM stating that Sunny has a Friday clinic this week but is seeing last patient at 01:15 PM. I stated to call the office back if she is interested in being seen on Friday. Provided a call back number.

## 2025-02-19 NOTE — TELEPHONE ENCOUNTER
Caller: Talita Marx    Relationship to patient: Self    Best call back number:      Chief complaint: MED CHECK    Type of visit: FOLLOW UP    Requested date: FRIDAY 2/21 AROUND 2:30 OR 3PM     If rescheduling, when is the original appointment: 2/24/25     Additional notes:PATIENT CALLING TO RESCHEDULE DUE TO THE OFFICE NEEDING TO RESCHEDULE PATIENT. PATIENT REQUESTING FRIDAY 2/21

## 2025-02-24 ENCOUNTER — OFFICE VISIT (OUTPATIENT)
Dept: PAIN MEDICINE | Facility: CLINIC | Age: 20
End: 2025-02-24
Payer: COMMERCIAL

## 2025-02-24 VITALS — HEIGHT: 63 IN | BODY MASS INDEX: 32.43 KG/M2 | WEIGHT: 183 LBS

## 2025-02-24 DIAGNOSIS — G90.521 COMPLEX REGIONAL PAIN SYNDROME I OF RIGHT LOWER LIMB: Primary | ICD-10-CM

## 2025-02-24 PROCEDURE — 99214 OFFICE O/P EST MOD 30 MIN: CPT

## 2025-02-24 RX ORDER — CELECOXIB 200 MG/1
200 CAPSULE ORAL DAILY
Qty: 30 CAPSULE | Refills: 1 | Status: SHIPPED | OUTPATIENT
Start: 2025-02-24

## 2025-02-24 NOTE — TELEPHONE ENCOUNTER
Increase gabapentin  Gabapentin 300 mg twice daily, 60 pills, 1 refill  Itz reviewed and compliant  Controlled substance agreement signed in office  Compliant UDS  Appropriate follow-up visit scheduled

## 2025-02-24 NOTE — PROGRESS NOTES
Referring Physician: No referring provider defined for this encounter.    Primary Physician: Dinorah Nix APRN    CHIEF COMPLAINT or REASON FOR VISIT: Leg Pain      Initial history of present illness on 11/18/2024:  Ms. Talita Marx is 19 y.o. female who presents as a new patient referral for evaluation and treatment of chronic right foot pain.  Patient stepped out of a horse trailer in August 2024 and injured her right ankle.  She subsequently followed up with orthopedics and was noted to have hypersensitivity consistent with complex regional pain syndrome.  She describes a burning pain with tingling which has grown from the original source of injury at her right lateral ankle to include the foot and lateral shin.  She has had some redness and swelling as well as a significant limitation of range of motion.  She has been engaged in physical therapy.  She feels that is worsening over time.  She has tried topical antineuropathic with modest benefit.  She has tried cyclobenzaprine, Lidoderm, naproxen, diclofenac.  Her father did have a history of CRPS.  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    Interval history:  Patient returns to clinic today for medication management.  Additionally, we are obtaining a thoracic and lumbar MRIs as well as psychiatric clearance for potential DRG/SCS trial in the event that she fails conservative measures.    Today, she continues to complain of chronic right foot pain.  This occurs on the top inside of her right foot extending into her right ankle.  She has tried gabapentin, pregabalin without relief.  She has been taking ibuprofen which did provide relief initially but is no longer doing so.  She has undergone injections without significant relief as well.  She is interested in additional strategies to help alleviate her pain.  She reports she has been evaluated [facility] where she  was deemed an appropriate candidate for a dorsal root ganglion/spinal cord stimulator trial.  She is an avid .  She denies any active thoughts of self-harm/active suicidal ideation.  Additionally, she does deny any current pregnancy or any chance of pregnancy.      Interventions:  12/5/2024: Right sympathetic nerve block with 30% relief for 1 day  12/19/2024: Right sympathetic nerve block with 30% relief for 1 day  1/02/2025: Right sympathetic nerve block with 30% relief for 1 day    Objective Pain Scoring:   BRIEF PAIN INVENTORY:  Total score:   Pain Score    02/24/25 1500   PainSc: 5    PainLoc: Leg        PHQ-2: 6  PHQ-9: 20  Opioid Risk Tool:         Review of Systems:   ROS negative except as otherwise noted     Past Medical History:   Past Medical History:   Diagnosis Date    Bipolar disorder     Chronic pain disorder     Depression     Extremity pain          Past Surgical History:   Past Surgical History:   Procedure Laterality Date    ORTHOPEDIC SURGERY           Family History   Family History   Problem Relation Age of Onset    Depression Father     Mental illness Father     Cancer Maternal Grandmother     Diabetes Paternal Grandfather     Arthritis Paternal Aunt     Depression Paternal Aunt     Cancer Maternal Aunt     Mental illness Maternal Aunt          Social History   Social History     Socioeconomic History    Marital status: Single   Tobacco Use    Smoking status: Never    Smokeless tobacco: Never   Vaping Use    Vaping status: Never Used   Substance and Sexual Activity    Alcohol use: Never    Drug use: Never    Sexual activity: Yes     Partners: Male     Birth control/protection: Nexplanon        Medications:     Current Outpatient Medications:     Etonogestrel (Nexplanon) 68 MG implant subdermal implant, To be inserted one time by prescriber. Route Subdermal., Disp: , Rfl:     hydrOXYzine (ATARAX) 25 MG tablet, Take 1 tablet by mouth., Disp: , Rfl:     venlafaxine XR (EFFEXOR-XR) 37.5  "MG 24 hr capsule, Take 1 capsule by mouth Daily., Disp: , Rfl:     celecoxib (CeleBREX) 200 MG capsule, Take 1 capsule by mouth Daily., Disp: 30 capsule, Rfl: 1        Physical Exam:     Vitals:    02/24/25 1500   Weight: 83 kg (183 lb)   Height: 160 cm (62.99\")   PainSc: 5    PainLoc: Leg          General: Alert and oriented, No acute distress.   HEENT: Normocephalic, atraumatic.   Cardiovascular: No gross edema  Respiratory: Respirations are non-labored      CRPS [place] Criteria LEFT RIGHT    Vasomotor (color, temp)  Yes   Sudomotor (swelling, sweating)  Yes   Motor/Trophic (ROM, strength, nails, hair)  Yes   Sensory (allodynia, hyperalgesia)   Yes       Sensory Exam: Full and equal sensation to light touch throughout with hypersensitivity to light touch in right ankle      Neurologic: Cranial Nerves II-XII are grossly intact.     Psychiatric: Cooperative.   Gait: Antalgic  Assistive Devices: Right ankle boot; crutch    Imaging Studies:   No results found for this or any previous visit.        Independent review of radiographic imaging:     Impression & Plan:       11/18/2024: Talita Marx is a 19 y.o. female with past medical history significant for bipolar disorder, depression, right anterior talofibular ligamentous strain who presents to the pain clinic for evaluation and treatment of persistent right ankle pain.  Evaluation consistent with complex regional pain syndrome type I.  We discussed the pathophysiology of this diagnosis as well as the treatment including aggressive physical therapy, lumbar sympathetic nerve blocks, membrane stabilizers.  Additionally briefly discussed neuromodulation for persistent symptoms.  I had a discussion with the patient regarding the risks of the procedure including bleeding, infection, damage to surrounding structures.  We discussed the potential adverse effects of corticosteroid injection including flushing of the face, lipodystrophy, skin discoloration, elevated blood " glucose, increased blood pressure.  Risks of frequent steroid administration include weight gain, hormonal changes, mood changes, osteoporosis.  1/23/2025: Some but transient relief from right sympathetic nerve blocks.  Will discontinue pregabalin due ineffectiveness.  Start low-dose gabapentin 100 mg twice daily.  We discussed potential spinal cord stimulator versus dorsal root ganglion stimulator at today's appointment.  Will attempt to exhaust conservative measures.  2/24/2025: Increase gabapentin.  Start Celebrex.  Can plan for right L5, S1 DRG trial once imaging has been completed.  Will reach out to [facility] to have psychiatric clearance sent over    1. Complex regional pain syndrome i of right lower limb            PLAN:  1. Medication Recommendations: Recommend Voltaren topical, NSAIDs, Tylenol.  Can trial turmeric 500 mg twice daily if NSAID contraindicated.  -Start Celebrex 200 mg daily.  I did inform her to avoid other anti-inflammatory/NSAIDs if she is taking this medication.  She voiced understanding.  -Increase gabapentin  Start gabapentin 300 mg twice daily, 60 pills, #1 refills  -As part of this patient's treatment plan, patient may be prescribed controlled substances. The patient has been made aware of appropriate use of such medications, including potential risk of somnolence, limited ability to drive and /or work safely, and potential for dependence or overdose. It has also been made clear that these medications are for use by this patient only, without concomitant use of alcohol or other substances unless prescribed. Controlled substance status of medication discussed with patient, discussed risks of medication including abuse potential and diversion potential and need to follow up for reevaluation appointment in order to receive further refills.  Itz was reviewed and compliant.    2. Physical Therapy: Continue PT    3. Psychological: She has been evaluated [facility] and deemed an appropriate candidate per patient report.  Will have official documentation faxed over to us.    4. Complementary and alternative (CAM) Therapies:     5. Labs/Diagnostic studies: Compliant UDS    6. Imaging: Pending thoracic and lumbar MRI for presurgical planning purposes    7. Interventions: Can consider proceeding with right L5, S1 dorsal root ganglion stimulator trial once appropriate once appropriate per psych and presurgical imaging is completed.  We did discuss this procedure in great detail today.   Risks of this procedure were discussed in detail.  They include bleeding, infection, damage to surrounding structures which could exacerbate symptoms paralysis, death, lead migration, malfunction of device, pocket site pain.   -We discussed appropriate postoperative restrictions to prevent lead migration  -We discussed appropriate wound care instructions as well.  -We did discuss in the event that she receives a permanent DRG implant that she is at a higher risk of lead migration secondary to riding horses.  8. Referrals: None indicated     9. Records: Itz reviewed    10. Lifestyle goals:    Follow-up 2 months  -If DRG trial is scheduled we will provide her with an appropriate 4-day follow-up for lead pull.    [facility]  Ford Lester PA-C          Quality Metrics:

## 2025-02-25 RX ORDER — GABAPENTIN 300 MG/1
300 CAPSULE ORAL 2 TIMES DAILY
Qty: 60 CAPSULE | Refills: 1 | Status: SHIPPED | OUTPATIENT
Start: 2025-02-25

## 2025-02-27 ENCOUNTER — TELEPHONE (OUTPATIENT)
Dept: PAIN MEDICINE | Facility: CLINIC | Age: 20
End: 2025-02-27

## 2025-02-27 NOTE — TELEPHONE ENCOUNTER
Surgery/Procedure:  Lumbar Sympathetic Block -right   Surgery/Procedure Date:  12/19/2024  Last visit:   Office Visit with Ford Lester PA-C (02/24/2025)   Next visit: 04/24/2025     Reason for call:    Lumbar spine MRI was denied by insurance. Do you want to set up a peer to peer?

## 2025-02-27 NOTE — TELEPHONE ENCOUNTER
UNABLE TO WARM TRANSFER    Caller: [facility] COORDINATOR      Best call back number: [phone]     What is the best time to reach you: ANYTIME- OKAY TO LEAVE A VOICEMAIL    Who are you requesting to speak with (clinical staff, provider,  specific staff member): CLINICAL    What was the call regarding: MRI OF THE LUMBAR     Is it okay if the provider responds through MyChart: [facility] JUAN CALLING TO INFORM MRI OF THE LUMBAR DENIED TO NOT SUPPORTING TO REMOVE SPINAL CORD STIM. SHE STATED SHE SCANNED DENIAL INTO THE PATIENTS CHART AND SHE WOULD LIKE A CALL BACK IF A PEER TO PEER WILL TAKE PLACE

## 2025-03-03 NOTE — TELEPHONE ENCOUNTER
S/w Sera to let her know that ordering provider is interested in setting up P2P for lumbar MRI.     Phone to call to set up P2P is [phone]  She said it would be with [facility].   Case ID # [id number].

## 2025-03-11 ENCOUNTER — APPOINTMENT (OUTPATIENT)
Facility: HOSPITAL | Age: 20
End: 2025-03-11
Payer: COMMERCIAL

## 2025-03-11 ENCOUNTER — HOSPITAL ENCOUNTER (OUTPATIENT)
Facility: HOSPITAL | Age: 20
Discharge: HOME | End: 2025-03-11
Payer: COMMERCIAL

## 2025-03-11 DIAGNOSIS — Z01.818 ENCOUNTER FOR PRE-OPERATIVE EXAMINATION: ICD-10-CM

## 2025-03-11 PROCEDURE — 72146 MRI CHEST SPINE W/O DYE: CPT

## 2025-03-25 NOTE — TELEPHONE ENCOUNTER
Caller: [facility] [employer]      Best call back number: [phone]    What was the call regarding: SPOE WITH [facility] CALLING IN REGARDS TO A PEER TO PEER    PLEASE SEE PREVIOUS MESSAGE   TRIED TO WT- NO ANSWER

## 2025-04-24 ENCOUNTER — OFFICE VISIT (OUTPATIENT)
Dept: PAIN MEDICINE | Facility: CLINIC | Age: 20
End: 2025-04-24
Payer: COMMERCIAL

## 2025-04-24 VITALS — BODY MASS INDEX: 34.85 KG/M2 | WEIGHT: 196.7 LBS

## 2025-04-24 DIAGNOSIS — Z51.81 THERAPEUTIC DRUG MONITORING: ICD-10-CM

## 2025-04-24 DIAGNOSIS — Z00.8 ENCOUNTER FOR PRE-SURGICAL PSYCHOLOGICAL ASSESSMENT: ICD-10-CM

## 2025-04-24 DIAGNOSIS — G90.521 COMPLEX REGIONAL PAIN SYNDROME I OF RIGHT LOWER LIMB: Primary | ICD-10-CM

## 2025-04-24 RX ORDER — BUSPIRONE HYDROCHLORIDE 5 MG/1
5 TABLET ORAL
COMMUNITY
Start: 2025-04-11 | End: 2025-07-10

## 2025-04-24 RX ORDER — DESVENLAFAXINE 50 MG/1
50 TABLET, FILM COATED, EXTENDED RELEASE ORAL DAILY
COMMUNITY
Start: 2025-04-11 | End: 2025-07-10

## 2025-04-24 RX ORDER — DICLOFENAC EPOLAMINE 0.01 G/1
1 SYSTEM TOPICAL
COMMUNITY
Start: 2025-03-28

## 2025-04-24 NOTE — PROGRESS NOTES
Referring Physician: No referring provider defined for this encounter.    Primary Physician: Dinorah Nix APRN    CHIEF COMPLAINT or REASON FOR VISIT: No chief complaint on file.      Initial history of present illness on 11/18/2024:  Ms. Talita Marx is 19 y.o. female who presents as a new patient referral for evaluation and treatment of chronic right foot pain.  Patient stepped out of a horse trailer in August 2024 and injured her right ankle.  She subsequently followed up with orthopedics and was noted to have hypersensitivity consistent with complex regional pain syndrome.  She describes a burning pain with tingling which has grown from the original source of injury at her right lateral ankle to include the foot and lateral shin.  She has had some redness and swelling as well as a significant limitation of range of motion.  She has been engaged in physical therapy.  She feels that is worsening over time.  She has tried topical antineuropathic with modest benefit.  She has tried cyclobenzaprine, Lidoderm, naproxen, diclofenac.  Her father did have a history of CRPS.  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    Interval history:  Patient returns to clinic today.  She was prescribed gabapentin 300 mg twice daily at her last office visit.  She did have a refill of this medication sent however she did not refill this medication.  Additionally, she has undergone a thoracic MRI for presurgical planning purposes.  Her lumbar MRI was denied by insurance for lack of physical therapy.  Today, she continues complain of chronic right foot pain as well as chronic left wrist pain.  She is no longer taking gabapentin.  She has met with pain management in [place] who provided her with a diclofenac patch which has been providing her with some relief.  Additionally, she has been undergoing scrambler therapy in [place] for her chronic right foot and  chronic left wrist pain.  She has undergone approximately 16 sessions and has received some relief so far.  She would like to exhaust all conservative measures before proceeding with potential DRG trial.  She may be interested in proceeding with potential DRG trial if minimal or transient relief to scrambler therapy.  No new issues or complaints at this time.  She has been stressed recently as she has college finals upcoming.    She denies any active thoughts of self-harm/active suicidal ideation.      Interventions:  12/5/2024: Right sympathetic nerve block with 30% relief for 1 day  12/19/2024: Right sympathetic nerve block with 30% relief for 1 day  1/02/2025: Right sympathetic nerve block with 30% relief for 1 day    Objective Pain Scoring:   BRIEF PAIN INVENTORY:  Total score:   Pain Score    04/24/25 1453   PainSc: 4    PainLoc: Generalized          PHQ-2: 6  PHQ-9: 19  Opioid Risk Tool:         Review of Systems:   ROS negative except as otherwise noted     Past Medical History:   Past Medical History:   Diagnosis Date    Bipolar disorder     Chronic pain disorder     Depression     Extremity pain          Past Surgical History:   Past Surgical History:   Procedure Laterality Date    ORTHOPEDIC SURGERY           Family History   Family History   Problem Relation Age of Onset    Depression Father     Mental illness Father     Cancer Maternal Grandmother     Diabetes Paternal Grandfather     Arthritis Paternal Aunt     Depression Paternal Aunt     Cancer Maternal Aunt     Mental illness Maternal Aunt          Social History   Social History     Socioeconomic History    Marital status: Single   Tobacco Use    Smoking status: Never    Smokeless tobacco: Never   Vaping Use    Vaping status: Never Used   Substance and Sexual Activity    Alcohol use: Never    Drug use: Never    Sexual activity: Yes     Partners: Male     Birth control/protection: Nexplanon        Medications:     Current Outpatient Medications:      busPIRone (BUSPAR) 5 MG tablet, Take 1 tablet by mouth., Disp: , Rfl:     celecoxib (CeleBREX) 200 MG capsule, Take 1 capsule by mouth Daily., Disp: 30 capsule, Rfl: 1    desvenlafaxine (PRISTIQ) 50 MG 24 hr tablet, Take 1 tablet by mouth Daily., Disp: , Rfl:     Diclofenac Epolamine (FLECTOR) 1.3 % patch patch, Place 1 patch on the skin as directed by provider., Disp: , Rfl:     Etonogestrel (Nexplanon) 68 MG implant subdermal implant, To be inserted one time by prescriber. Route Subdermal., Disp: , Rfl:     venlafaxine XR (EFFEXOR-XR) 37.5 MG 24 hr capsule, Take 1 capsule by mouth Daily., Disp: , Rfl:         Physical Exam:     Vitals:    04/24/25 1453   Weight: 89.2 kg (196 lb 11.2 oz)   PainSc: 4    PainLoc: Generalized            General: Alert and oriented, No acute distress.   HEENT: Normocephalic, atraumatic.   Cardiovascular: No gross edema  Respiratory: Respirations are non-labored      CRPS [place] Criteria LEFT RIGHT    Vasomotor (color, temp)  Yes   Sudomotor (swelling, sweating)  Yes   Motor/Trophic (ROM, strength, nails, hair)  Yes   Sensory (allodynia, hyperalgesia)   Yes       Sensory Exam: Full and equal sensation to light touch throughout with hypersensitivity to light touch in right ankle      Neurologic: Cranial Nerves II-XII are grossly intact.     Psychiatric: Cooperative.   Gait: Antalgic  Assistive Devices: Right ankle boot; crutch    Imaging Studies:   No results found for this or any previous visit.        Independent review of radiographic imaging:     Impression & Plan:       11/18/2024: Talita Marx is a 19 y.o. female with past medical history significant for bipolar disorder, depression, right anterior talofibular ligamentous strain who presents to the pain clinic for evaluation and treatment of persistent right ankle pain.  Evaluation consistent with complex regional pain syndrome type I.  We discussed the pathophysiology of this diagnosis as well as the treatment including  aggressive physical therapy, lumbar sympathetic nerve blocks, membrane stabilizers.  Additionally briefly discussed neuromodulation for persistent symptoms.  I had a discussion with the patient regarding the risks of the procedure including bleeding, infection, damage to surrounding structures.  We discussed the potential adverse effects of corticosteroid injection including flushing of the face, lipodystrophy, skin discoloration, elevated blood glucose, increased blood pressure.  Risks of frequent steroid administration include weight gain, hormonal changes, mood changes, osteoporosis.  1/23/2025: Some but transient relief from right sympathetic nerve blocks.  Will discontinue pregabalin due ineffectiveness.  Start low-dose gabapentin 100 mg twice daily.  We discussed potential spinal cord stimulator versus dorsal root ganglion stimulator at today's appointment.  Will attempt to exhaust conservative measures.  2/24/2025: Increase gabapentin.  Start Celebrex.  Can plan for right L5, S1 DRG trial once imaging has been completed.  Will reach out to [facility] to have psychiatric clearance sent over  4/24/2025: Discontinue gabapentin.  Patient undergoing scrambler therapy for CRPS in [place].  Would like to wait before undergoing DRG.    1. Complex regional pain syndrome i of right lower limb    2. Encounter for pre-surgical psychological assessment    3. Therapeutic drug monitoring              PLAN:  1. Medication Recommendations: Recommend Voltaren topical, NSAIDs, Tylenol.  Can trial turmeric 500 mg twice daily if NSAID contraindicated.  Discontinue Celebrex.  Discontinue gabapentin.    2. Physical Therapy: Continue PT    3. Psychological:     4. Complementary and alternative (CAM) Therapies:     5. Labs/Diagnostic studies: Compliant UDS    6. Imaging: Thoracic MRI report was unremarkable.  Would need lumbar MRI if pursuing DRG trial    7. Interventions: Would need lumbar MRI if pursuing DRG trial.   Patient would like to hold off on trial at the moment.  8. Referrals: None indicated     9. Records: Itz reviewed    10. Lifestyle goals:    Follow-up as needed    [facility]  Ford Lester PA-C          Quality Metrics:

## 2025-08-17 ENCOUNTER — HOSPITAL ENCOUNTER (EMERGENCY)
Facility: HOSPITAL | Age: 20
Discharge: HOME | End: 2025-08-17
Attending: EMERGENCY MEDICINE | Admitting: EMERGENCY MEDICINE
Payer: COMMERCIAL

## 2025-08-17 ENCOUNTER — APPOINTMENT (OUTPATIENT)
Facility: HOSPITAL | Age: 20
End: 2025-08-17
Payer: COMMERCIAL

## 2025-08-17 VITALS
OXYGEN SATURATION: 98 % | WEIGHT: 206 LBS | DIASTOLIC BLOOD PRESSURE: 68 MMHG | SYSTOLIC BLOOD PRESSURE: 108 MMHG | HEIGHT: 65 IN | BODY MASS INDEX: 34.32 KG/M2 | RESPIRATION RATE: 18 BRPM | TEMPERATURE: 98.6 F | HEART RATE: 89 BPM

## 2025-08-17 DIAGNOSIS — B34.8 RHINOVIRUS: ICD-10-CM

## 2025-08-17 DIAGNOSIS — J06.9 VIRAL UPPER RESPIRATORY TRACT INFECTION: Primary | ICD-10-CM

## 2025-08-17 LAB
B PARAPERT DNA SPEC QL NAA+PROBE: NOT DETECTED
B PERT DNA SPEC QL NAA+PROBE: NOT DETECTED
C PNEUM DNA NPH QL NAA+NON-PROBE: NOT DETECTED
FLUAV SUBTYP SPEC NAA+PROBE: NOT DETECTED
FLUBV RNA NPH QL NAA+NON-PROBE: NOT DETECTED
HADV DNA SPEC NAA+PROBE: NOT DETECTED
HCOV 229E RNA SPEC QL NAA+PROBE: NOT DETECTED
HCOV HKU1 RNA SPEC QL NAA+PROBE: NOT DETECTED
HCOV NL63 RNA SPEC QL NAA+PROBE: NOT DETECTED
HCOV OC43 RNA SPEC QL NAA+PROBE: NOT DETECTED
HMPV RNA NPH QL NAA+NON-PROBE: NOT DETECTED
HPIV1 RNA ISLT QL NAA+PROBE: NOT DETECTED
HPIV2 RNA SPEC QL NAA+PROBE: NOT DETECTED
HPIV3 RNA NPH QL NAA+PROBE: NOT DETECTED
HPIV4 P GENE NPH QL NAA+PROBE: NOT DETECTED
M PNEUMO IGG SER IA-ACNC: NOT DETECTED
RHINOVIRUS RNA SPEC NAA+PROBE: DETECTED
RSV RNA NPH QL NAA+NON-PROBE: NOT DETECTED
S PYO AG THROAT QL: NEGATIVE
SARS-COV-2 RNA RESP QL NAA+PROBE: NOT DETECTED

## 2025-08-17 PROCEDURE — 93005 ELECTROCARDIOGRAM TRACING: CPT | Performed by: EMERGENCY MEDICINE

## 2025-08-17 PROCEDURE — 87081 CULTURE SCREEN ONLY: CPT | Performed by: PHYSICIAN ASSISTANT

## 2025-08-17 PROCEDURE — 71045 X-RAY EXAM CHEST 1 VIEW: CPT

## 2025-08-17 PROCEDURE — 87880 STREP A ASSAY W/OPTIC: CPT | Performed by: PHYSICIAN ASSISTANT

## 2025-08-17 PROCEDURE — 0202U NFCT DS 22 TRGT SARS-COV-2: CPT | Performed by: PHYSICIAN ASSISTANT

## 2025-08-17 PROCEDURE — 93005 ELECTROCARDIOGRAM TRACING: CPT

## 2025-08-17 PROCEDURE — 99284 EMERGENCY DEPT VISIT MOD MDM: CPT | Performed by: EMERGENCY MEDICINE

## 2025-08-19 LAB
QT INTERVAL: 346 MS
QTC INTERVAL: 465 MS

## 2025-08-20 LAB — BACTERIA SPEC AEROBE CULT: NORMAL
